# Patient Record
Sex: FEMALE | URBAN - METROPOLITAN AREA
[De-identification: names, ages, dates, MRNs, and addresses within clinical notes are randomized per-mention and may not be internally consistent; named-entity substitution may affect disease eponyms.]

---

## 2022-02-19 ENCOUNTER — WEB ENCOUNTER (OUTPATIENT)
Dept: URBAN - METROPOLITAN AREA CLINIC 98 | Facility: CLINIC | Age: 62
End: 2022-02-19

## 2022-02-24 ENCOUNTER — WEB ENCOUNTER (OUTPATIENT)
Dept: URBAN - METROPOLITAN AREA CLINIC 98 | Facility: CLINIC | Age: 62
End: 2022-02-24

## 2022-03-22 ENCOUNTER — OFFICE VISIT (OUTPATIENT)
Dept: URBAN - METROPOLITAN AREA CLINIC 98 | Facility: CLINIC | Age: 62
End: 2022-03-22
Payer: COMMERCIAL

## 2022-03-22 DIAGNOSIS — K31.84 GASTROPARESIS: ICD-10-CM

## 2022-03-22 DIAGNOSIS — K59.09 CHRONIC CONSTIPATION: ICD-10-CM

## 2022-03-22 PROCEDURE — 99204 OFFICE O/P NEW MOD 45 MIN: CPT | Performed by: INTERNAL MEDICINE

## 2022-03-22 RX ORDER — PLECANATIDE 3 MG/1
1 TABLET TABLET ORAL ONCE A DAY
Qty: 30 | Refills: 3 | OUTPATIENT

## 2022-03-22 RX ORDER — PRUCALOPRIDE 2 MG/1
1 TABLET TABLET, FILM COATED ORAL ONCE A DAY
Qty: 30 | Refills: 3 | OUTPATIENT

## 2022-03-22 RX ORDER — TEMAZEPAM 30 MG/1
CAPSULE ORAL
Qty: 30 | Status: ACTIVE | COMMUNITY

## 2022-03-22 RX ORDER — ESTRADIOL 2 MG/1
TABLET ORAL
Qty: 30 | Status: ACTIVE | COMMUNITY

## 2022-03-22 RX ORDER — TRAMADOL HYDROCHLORIDE 50 MG/1
TABLET, FILM COATED ORAL
Qty: 30 | Status: ACTIVE | COMMUNITY

## 2022-03-22 RX ORDER — SUCRALFATE 1 G/1
TABLET ORAL
Qty: 90 | Status: ACTIVE | COMMUNITY

## 2022-03-22 RX ORDER — ALPRAZOLAM 0.5 MG/1
TABLET ORAL
Qty: 45 | Status: ACTIVE | COMMUNITY

## 2022-03-22 RX ORDER — OMEPRAZOLE 40 MG/1
CAPSULE, DELAYED RELEASE ORAL
Qty: 90 | Status: ACTIVE | COMMUNITY

## 2022-03-22 NOTE — HPI-TODAY'S VISIT:
BM  every 8 days multiple abdominal surgeries multiple hernias with mesh nissen fundoplication hysterectomy  GB Nissen fundoplication has had multiple EGD's and colonoscopies food in stomach c/w gstroparesis

## 2022-04-06 ENCOUNTER — TELEPHONE ENCOUNTER (OUTPATIENT)
Dept: URBAN - METROPOLITAN AREA CLINIC 98 | Facility: CLINIC | Age: 62
End: 2022-04-06

## 2022-04-06 RX ORDER — PLECANATIDE 3 MG/1
1 TABLET TABLET ORAL ONCE A DAY
Qty: 30 | Refills: 3
End: 2022-08-04

## 2022-04-19 ENCOUNTER — OFFICE VISIT (OUTPATIENT)
Dept: URBAN - METROPOLITAN AREA CLINIC 98 | Facility: CLINIC | Age: 62
End: 2022-04-19
Payer: COMMERCIAL

## 2022-04-19 VITALS
BODY MASS INDEX: 22.59 KG/M2 | WEIGHT: 135.6 LBS | SYSTOLIC BLOOD PRESSURE: 156 MMHG | TEMPERATURE: 96.7 F | HEIGHT: 65 IN | HEART RATE: 61 BPM | DIASTOLIC BLOOD PRESSURE: 71 MMHG

## 2022-04-19 DIAGNOSIS — K31.84 GASTROPARESIS: ICD-10-CM

## 2022-04-19 DIAGNOSIS — K59.09 CHRONIC CONSTIPATION: ICD-10-CM

## 2022-04-19 PROCEDURE — 99213 OFFICE O/P EST LOW 20 MIN: CPT | Performed by: INTERNAL MEDICINE

## 2022-04-19 RX ORDER — SUCRALFATE 1 G/1
TABLET ORAL
Qty: 90 | COMMUNITY

## 2022-04-19 RX ORDER — ALPRAZOLAM 0.5 MG/1
TABLET ORAL
Qty: 45 | COMMUNITY

## 2022-04-19 RX ORDER — PRUCALOPRIDE 2 MG/1
1 TABLET TABLET, FILM COATED ORAL ONCE A DAY
Qty: 30 | Refills: 3 | COMMUNITY

## 2022-04-19 RX ORDER — PRUCALOPRIDE 2 MG/1
1 TABLET TABLET, FILM COATED ORAL ONCE A DAY
Qty: 30 | Refills: 3
End: 2022-08-17

## 2022-04-19 RX ORDER — ESTRADIOL 2 MG/1
TABLET ORAL
Qty: 30 | COMMUNITY

## 2022-04-19 RX ORDER — TRAMADOL HYDROCHLORIDE 50 MG/1
TABLET, FILM COATED ORAL
Qty: 30 | COMMUNITY

## 2022-04-19 RX ORDER — PLECANATIDE 3 MG/1
1 TABLET TABLET ORAL ONCE A DAY
Qty: 30 | Refills: 3 | COMMUNITY
End: 2022-08-04

## 2022-04-19 RX ORDER — TEMAZEPAM 30 MG/1
CAPSULE ORAL
Qty: 30 | COMMUNITY

## 2022-04-19 RX ORDER — PLECANATIDE 3 MG/1
1 TABLET TABLET ORAL ONCE A DAY
Qty: 30 | Refills: 3
End: 2022-08-17

## 2022-04-19 RX ORDER — OMEPRAZOLE 40 MG/1
CAPSULE, DELAYED RELEASE ORAL
Qty: 90 | COMMUNITY

## 2022-04-19 NOTE — PHYSICAL EXAM GASTROINTESTINAL
Abdomen , soft, nontender,surgical scar , Liver and Spleen , no hepatomegaly present , no hepatosplenomegaly , liver nontender , spleen not palpable , Abdomen , soft, nontender, nondistended , no guarding or rigidity , no masses palpable , normal bowel sounds , Liver and Spleen , no hepatomegaly present , no hepatosplenomegaly , liver nontender , spleen not palpable

## 2022-04-19 NOTE — HPI-TODAY'S VISIT:
BM  every 8 days multiple abdominal surgeries multiple hernias with mesh nissen fundoplication hysterectomy  GB Nissen fundoplication has had multiple EGD's and colonoscopies food in stomach c/w gstroparesis 4/19/22 motegrity was working otherwise less than 1/wk colonoscopy/EGD  2021 elsewhere

## 2022-04-20 ENCOUNTER — TELEPHONE ENCOUNTER (OUTPATIENT)
Dept: URBAN - METROPOLITAN AREA CLINIC 98 | Facility: CLINIC | Age: 62
End: 2022-04-20

## 2022-05-02 ENCOUNTER — TELEPHONE ENCOUNTER (OUTPATIENT)
Dept: URBAN - METROPOLITAN AREA CLINIC 98 | Facility: CLINIC | Age: 62
End: 2022-05-02

## 2023-01-24 ENCOUNTER — OFFICE VISIT (OUTPATIENT)
Dept: URBAN - METROPOLITAN AREA CLINIC 98 | Facility: CLINIC | Age: 63
End: 2023-01-24
Payer: COMMERCIAL

## 2023-01-24 VITALS
TEMPERATURE: 98.8 F | DIASTOLIC BLOOD PRESSURE: 83 MMHG | WEIGHT: 132 LBS | SYSTOLIC BLOOD PRESSURE: 149 MMHG | BODY MASS INDEX: 21.99 KG/M2 | HEART RATE: 71 BPM | HEIGHT: 65 IN

## 2023-01-24 DIAGNOSIS — K66.0 ABDOMINAL ADHESIONS: ICD-10-CM

## 2023-01-24 DIAGNOSIS — K31.84 GASTROPARESIS: ICD-10-CM

## 2023-01-24 DIAGNOSIS — K59.09 CHRONIC CONSTIPATION: ICD-10-CM

## 2023-01-24 DIAGNOSIS — Z98.890 HISTORY OF FUNDOPLICATION: ICD-10-CM

## 2023-01-24 DIAGNOSIS — K46.9 NON-RECURRENT ABDOMINAL HERNIA WITHOUT OBSTRUCTION OR GANGRENE, UNSPECIFIED HERNIA TYPE: ICD-10-CM

## 2023-01-24 PROCEDURE — 99214 OFFICE O/P EST MOD 30 MIN: CPT | Performed by: INTERNAL MEDICINE

## 2023-01-24 RX ORDER — TEMAZEPAM 30 MG/1
CAPSULE ORAL
Qty: 30 | COMMUNITY

## 2023-01-24 RX ORDER — OMEPRAZOLE 40 MG/1
CAPSULE, DELAYED RELEASE ORAL
Qty: 90 | COMMUNITY

## 2023-01-24 RX ORDER — ALPRAZOLAM 0.5 MG/1
TABLET ORAL
Qty: 45 | COMMUNITY

## 2023-01-24 RX ORDER — PLECANATIDE 3 MG/1
1 TABLET TABLET ORAL ONCE A DAY
Qty: 30 | Refills: 3
End: 2023-05-24

## 2023-01-24 RX ORDER — TRAMADOL HYDROCHLORIDE 50 MG/1
TABLET, FILM COATED ORAL
Qty: 30 | COMMUNITY

## 2023-01-24 RX ORDER — SUCRALFATE 1 G/1
TABLET ORAL
Qty: 90 | COMMUNITY

## 2023-01-24 RX ORDER — LACTULOSE 10 G/15ML
30 ML SOLUTION ORAL BID
Qty: 1800 ML | Refills: 3 | OUTPATIENT

## 2023-01-24 RX ORDER — PRUCALOPRIDE 2 MG/1
1 TABLET TABLET, FILM COATED ORAL ONCE A DAY
Qty: 30 | Refills: 3
End: 2023-05-24

## 2023-01-24 RX ORDER — ESTRADIOL 2 MG/1
TABLET ORAL
Qty: 30 | COMMUNITY

## 2023-01-24 NOTE — HPI-TODAY'S VISIT:
BM  every 8 days multiple abdominal surgeries multiple hernias with mesh nissen fundoplication hysterectomy  GB Nissen fundoplication has had multiple EGD's and colonoscopies food in stomach c/w gstroparesis 4/19/22 motegrity was working otherwise less than 1/wk colonoscopy/EGD  2021 elsewhere BM  every 8 days multiple abdominal surgeries multiple hernias with mesh nissen fundoplication hysterectomy  GB Nissen fundoplication has had multiple EGD's and colonoscopies food in stomach c/w gstroparesis . 1/24/23 taking motergity and trulance no BM for 6 days then finally hard stools now painful BM hospitalized 12/22 in [facility] treated for infectoion. cannot eat gets very bloated nausea takes phenergan and lies down

## 2023-01-30 ENCOUNTER — TELEPHONE ENCOUNTER (OUTPATIENT)
Dept: URBAN - METROPOLITAN AREA CLINIC 98 | Facility: CLINIC | Age: 63
End: 2023-01-30

## 2023-02-03 ENCOUNTER — OFFICE VISIT (OUTPATIENT)
Dept: URBAN - METROPOLITAN AREA TELEHEALTH 2 | Facility: TELEHEALTH | Age: 63
End: 2023-02-03

## 2023-02-03 RX ORDER — LACTULOSE 10 G/15ML
30 ML SOLUTION ORAL BID
Qty: 1800 ML | Refills: 3 | Status: ACTIVE | COMMUNITY

## 2023-02-03 RX ORDER — ESTRADIOL 2 MG/1
TABLET ORAL
Qty: 30 | COMMUNITY

## 2023-02-03 RX ORDER — TEMAZEPAM 30 MG/1
CAPSULE ORAL
Qty: 30 | COMMUNITY

## 2023-02-03 RX ORDER — ALPRAZOLAM 0.5 MG/1
TABLET ORAL
Qty: 45 | COMMUNITY

## 2023-02-03 RX ORDER — SUCRALFATE 1 G/1
TABLET ORAL
Qty: 90 | COMMUNITY

## 2023-02-03 RX ORDER — PLECANATIDE 3 MG/1
1 TABLET TABLET ORAL ONCE A DAY
Qty: 30 | Refills: 3 | Status: ACTIVE | COMMUNITY
End: 2023-05-24

## 2023-02-03 RX ORDER — OMEPRAZOLE 40 MG/1
CAPSULE, DELAYED RELEASE ORAL
Qty: 90 | COMMUNITY

## 2023-02-03 RX ORDER — TRAMADOL HYDROCHLORIDE 50 MG/1
TABLET, FILM COATED ORAL
Qty: 30 | COMMUNITY

## 2023-02-03 RX ORDER — PRUCALOPRIDE 2 MG/1
1 TABLET TABLET, FILM COATED ORAL ONCE A DAY
Qty: 30 | Refills: 3 | Status: ACTIVE | COMMUNITY
End: 2023-05-24

## 2023-02-10 ENCOUNTER — DASHBOARD ENCOUNTERS (OUTPATIENT)
Age: 63
End: 2023-02-10

## 2023-02-10 ENCOUNTER — OFFICE VISIT (OUTPATIENT)
Dept: URBAN - METROPOLITAN AREA CLINIC 98 | Facility: CLINIC | Age: 63
End: 2023-02-10
Payer: COMMERCIAL

## 2023-02-10 VITALS
HEART RATE: 70 BPM | DIASTOLIC BLOOD PRESSURE: 74 MMHG | TEMPERATURE: 97.2 F | SYSTOLIC BLOOD PRESSURE: 135 MMHG | WEIGHT: 127 LBS | BODY MASS INDEX: 21.16 KG/M2 | HEIGHT: 65 IN

## 2023-02-10 DIAGNOSIS — K59.04 CHRONIC IDIOPATHIC CONSTIPATION: ICD-10-CM

## 2023-02-10 DIAGNOSIS — K31.84 GASTROPARESIS: ICD-10-CM

## 2023-02-10 PROBLEM — 82934008: Status: ACTIVE | Noted: 2023-02-10

## 2023-02-10 PROBLEM — 235675006: Status: ACTIVE | Noted: 2022-03-22

## 2023-02-10 PROCEDURE — 99214 OFFICE O/P EST MOD 30 MIN: CPT | Performed by: INTERNAL MEDICINE

## 2023-02-10 RX ORDER — TEMAZEPAM 30 MG/1
CAPSULE ORAL
Qty: 30 | Status: ON HOLD | COMMUNITY

## 2023-02-10 RX ORDER — PRUCALOPRIDE 2 MG/1
1 TABLET TABLET, FILM COATED ORAL ONCE A DAY
Qty: 30 | Refills: 3 | Status: ACTIVE | COMMUNITY
End: 2023-05-24

## 2023-02-10 RX ORDER — OMEPRAZOLE 40 MG/1
CAPSULE, DELAYED RELEASE ORAL
Qty: 90 | Status: ACTIVE | COMMUNITY

## 2023-02-10 RX ORDER — TRAMADOL HYDROCHLORIDE 50 MG/1
TABLET, FILM COATED ORAL
Qty: 30 | Status: ACTIVE | COMMUNITY

## 2023-02-10 RX ORDER — ALPRAZOLAM 0.5 MG/1
TABLET ORAL
Qty: 45 | Status: ACTIVE | COMMUNITY

## 2023-02-10 RX ORDER — PLECANATIDE 3 MG/1
1 TABLET TABLET ORAL ONCE A DAY
Qty: 30 | Refills: 3 | Status: ACTIVE | COMMUNITY
End: 2023-05-24

## 2023-02-10 RX ORDER — LACTULOSE 10 G/15ML
30 ML SOLUTION ORAL BID
Qty: 1800 ML | Refills: 3 | Status: ACTIVE | COMMUNITY

## 2023-02-10 RX ORDER — POLYETHYLENE GLYCOL 3350, SODIUM CHLORIDE, SODIUM BICARBONATE, POTASSIUM CHLORIDE 420; 11.2; 5.72; 1.48 G/4L; G/4L; G/4L; G/4L
AS DIRECTED POWDER, FOR SOLUTION ORAL ONCE
Qty: 420 GM | Refills: 0 | OUTPATIENT
Start: 2023-02-10 | End: 2023-02-11

## 2023-02-10 RX ORDER — ESTRADIOL 2 MG/1
TABLET ORAL
Qty: 30 | Status: ACTIVE | COMMUNITY

## 2023-02-10 NOTE — EXAM-FUNCTIONAL ASSESSMENT
Constitutional: well-developed, normal communication ability.   Eyes: Conjunctivae and eyelids appear normal, no scleral icterus. Respiratory: symmetric expansion of chest wall, normal work of breathing   Gastrointestinal:  well-healed scar, normoactive bowel sounds, soft, generalized abdominal tenderness, no rebound tenderness, no shifting dullness, no organomegaly.   Musculoskeletal: normal gait and station   Skin: no jaundice   Neurologic: Oriented to person, place, time. Short term memory intact.    Psychiatric: Normal mood and appropriate affect.

## 2023-02-10 NOTE — HPI-TODAY'S VISIT:
Ms. Delaney is a 64 yo F presenting for followup. Last visit 1/24/23 with Dr. Mclain.  H/o severe constipation, has BM every 8 days. Went to hospital for this 2/2/23. Was treated with laxatives and had a BM that day.  H/o gastroparesis. No vomiting recently. Does get nauseated and takes phenergan occasionally for this. H/o fundoplication, umbilical hernia repair, cholecystectomy, total hysterectomy Drinks 6 bottles of water per day Has been inactive for 3 weeks Says she has not had other constipation workup in the past Was taking motegrity for gastroparesis and Trulance for constipation- worked well for about 6 months, then stopped in October. Sometimes takes lactulose on top of this, rarely helps No weight loss, no blood in the stools Able to pass gas

## 2023-02-14 ENCOUNTER — TELEPHONE ENCOUNTER (OUTPATIENT)
Dept: URBAN - METROPOLITAN AREA CLINIC 98 | Facility: CLINIC | Age: 63
End: 2023-02-14

## 2023-04-09 ENCOUNTER — WEB ENCOUNTER (OUTPATIENT)
Dept: URBAN - METROPOLITAN AREA CLINIC 98 | Facility: CLINIC | Age: 63
End: 2023-04-09

## 2023-04-09 RX ORDER — PLECANATIDE 3 MG/1
1 TABLET TABLET ORAL ONCE A DAY
Qty: 30 | Refills: 3
End: 2023-08-08

## 2023-04-12 ENCOUNTER — OFFICE VISIT (OUTPATIENT)
Dept: URBAN - METROPOLITAN AREA CLINIC 98 | Facility: CLINIC | Age: 63
End: 2023-04-12